# Patient Record
Sex: MALE | Race: WHITE | NOT HISPANIC OR LATINO | Employment: UNEMPLOYED | URBAN - METROPOLITAN AREA
[De-identification: names, ages, dates, MRNs, and addresses within clinical notes are randomized per-mention and may not be internally consistent; named-entity substitution may affect disease eponyms.]

---

## 2023-03-14 PROBLEM — H53.9 VISION CHANGES: Status: ACTIVE | Noted: 2023-03-14

## 2023-03-14 PROBLEM — G89.29 CHRONIC BACK PAIN: Status: ACTIVE | Noted: 2023-03-14

## 2023-03-14 PROBLEM — B37.0 ORAL THRUSH: Status: ACTIVE | Noted: 2023-03-14

## 2023-03-14 PROBLEM — F17.200 SMOKER: Status: ACTIVE | Noted: 2023-03-14

## 2023-03-14 PROBLEM — R91.1 LUNG NODULE: Status: ACTIVE | Noted: 2023-03-14

## 2023-03-14 PROBLEM — H91.93 DECREASED HEARING OF BOTH EARS: Status: ACTIVE | Noted: 2023-03-14

## 2023-03-14 PROBLEM — E66.811 CLASS 1 OBESITY WITH BODY MASS INDEX (BMI) OF 30.0 TO 30.9 IN ADULT: Status: ACTIVE | Noted: 2023-03-14

## 2023-03-14 PROBLEM — H57.89 IRRITATION OF RIGHT EYE: Status: ACTIVE | Noted: 2023-03-14

## 2023-03-14 PROBLEM — H52.03 HYPERMETROPIA OF BOTH EYES: Status: ACTIVE | Noted: 2023-03-14

## 2023-03-14 PROBLEM — M25.522 LEFT ELBOW PAIN: Status: ACTIVE | Noted: 2023-03-14

## 2023-03-14 PROBLEM — J02.9 SORE THROAT: Status: ACTIVE | Noted: 2023-03-14

## 2023-03-14 PROBLEM — H11.153 PINGUECULA OF BOTH EYES: Status: ACTIVE | Noted: 2023-03-14

## 2023-03-14 PROBLEM — H04.129 DRY EYE SYNDROME: Status: ACTIVE | Noted: 2023-03-14

## 2023-03-14 PROBLEM — M25.532 LEFT WRIST PAIN: Status: ACTIVE | Noted: 2023-03-14

## 2023-03-14 PROBLEM — E79.0 HYPERURICEMIA: Status: ACTIVE | Noted: 2023-03-14

## 2023-03-14 PROBLEM — H91.90 HEARING LOSS: Status: ACTIVE | Noted: 2023-03-14

## 2023-03-14 PROBLEM — E78.5 DYSLIPIDEMIA, GOAL LDL BELOW 130: Status: ACTIVE | Noted: 2023-03-14

## 2023-03-14 PROBLEM — R73.9 HYPERGLYCEMIA: Status: ACTIVE | Noted: 2023-03-14

## 2023-03-14 PROBLEM — R94.120 NEGATIVE MIDDLE EAR PRESSURE OF RIGHT EAR WITH TYPE C TYMPANOGRAM CURVE: Status: ACTIVE | Noted: 2023-03-14

## 2023-03-14 PROBLEM — E66.9 CLASS 1 OBESITY WITH BODY MASS INDEX (BMI) OF 30.0 TO 30.9 IN ADULT: Status: ACTIVE | Noted: 2023-03-14

## 2023-03-14 PROBLEM — R42 VERTIGO: Status: ACTIVE | Noted: 2023-03-14

## 2023-03-14 PROBLEM — R91.8 PULMONARY NODULES: Status: ACTIVE | Noted: 2023-03-14

## 2023-03-14 PROBLEM — R22.31 MASS OF RIGHT AXILLA: Status: ACTIVE | Noted: 2023-03-14

## 2023-03-14 PROBLEM — K21.9 ACID REFLUX: Status: ACTIVE | Noted: 2023-03-14

## 2023-03-14 PROBLEM — M54.9 CHRONIC BACK PAIN: Status: ACTIVE | Noted: 2023-03-14

## 2023-03-14 PROBLEM — H52.4 BILATERAL PRESBYOPIA: Status: ACTIVE | Noted: 2023-03-14

## 2023-03-14 PROBLEM — M79.89 SWELLING OF LEFT HAND: Status: ACTIVE | Noted: 2023-03-14

## 2023-03-14 PROBLEM — H57.10 EYE PAIN: Status: ACTIVE | Noted: 2023-03-14

## 2023-03-14 PROBLEM — I10 ESSENTIAL HYPERTENSION: Status: ACTIVE | Noted: 2023-03-14

## 2023-03-14 PROBLEM — H52.203 ASTIGMATISM, BILATERAL: Status: ACTIVE | Noted: 2023-03-14

## 2023-03-14 PROBLEM — H90.6 MIXED HEARING LOSS, BILATERAL: Status: ACTIVE | Noted: 2023-03-14

## 2023-03-14 PROBLEM — R09.89 BRUIT: Status: ACTIVE | Noted: 2023-03-14

## 2023-03-14 PROBLEM — K29.70 GASTRITIS: Status: ACTIVE | Noted: 2023-03-14

## 2023-03-14 PROBLEM — H57.9 EYE EXAM ABNORMAL: Status: ACTIVE | Noted: 2023-03-14

## 2023-03-14 PROBLEM — H10.9 CONJUNCTIVITIS: Status: ACTIVE | Noted: 2023-03-14

## 2023-03-14 PROBLEM — H31.099 PERIPHERAL RETINAL SCARS: Status: ACTIVE | Noted: 2023-03-14

## 2023-03-14 PROBLEM — J44.9 COPD (CHRONIC OBSTRUCTIVE PULMONARY DISEASE) (MULTI): Status: ACTIVE | Noted: 2023-03-14

## 2023-03-14 PROBLEM — R93.1 ABNORMAL SCREENING CARDIAC CT: Status: ACTIVE | Noted: 2023-03-14

## 2023-03-14 PROBLEM — K44.9 HIATAL HERNIA: Status: ACTIVE | Noted: 2023-03-14

## 2023-03-14 PROBLEM — E78.2 MIXED HYPERLIPIDEMIA: Status: ACTIVE | Noted: 2023-03-14

## 2023-03-14 PROBLEM — R93.89 ABNORMAL CHEST CT: Status: ACTIVE | Noted: 2023-03-14

## 2023-03-14 PROBLEM — H93.13 SUBJECTIVE TINNITUS OF BOTH EARS: Status: ACTIVE | Noted: 2023-03-14

## 2023-03-14 RX ORDER — COLCHICINE 0.6 MG/1
0.6 TABLET ORAL 2 TIMES DAILY PRN
COMMUNITY
Start: 2022-02-11

## 2023-03-14 RX ORDER — ALBUTEROL SULFATE 90 UG/1
AEROSOL, METERED RESPIRATORY (INHALATION)
COMMUNITY
Start: 2022-03-16

## 2023-03-14 RX ORDER — TIOTROPIUM BROMIDE AND OLODATEROL 3.124; 2.736 UG/1; UG/1
2 SPRAY, METERED RESPIRATORY (INHALATION) DAILY
COMMUNITY
Start: 2022-03-18

## 2023-03-14 RX ORDER — ROSUVASTATIN CALCIUM 5 MG/1
1 TABLET, COATED ORAL DAILY
COMMUNITY
Start: 2022-09-14 | End: 2023-09-11

## 2023-03-14 RX ORDER — ALLOPURINOL 300 MG/1
1 TABLET ORAL EVERY MORNING
COMMUNITY
Start: 2022-06-13 | End: 2023-09-26 | Stop reason: SDUPTHER

## 2023-03-14 RX ORDER — AMLODIPINE BESYLATE 5 MG/1
5 TABLET ORAL DAILY
COMMUNITY
End: 2023-09-26 | Stop reason: SDUPTHER

## 2023-03-16 ENCOUNTER — APPOINTMENT (OUTPATIENT)
Dept: PRIMARY CARE | Facility: CLINIC | Age: 63
End: 2023-03-16
Payer: MEDICAID

## 2023-05-22 DIAGNOSIS — E79.0 HYPERURICEMIA: ICD-10-CM

## 2023-05-22 RX ORDER — ALLOPURINOL 100 MG/1
TABLET ORAL
Qty: 90 TABLET | Refills: 3 | Status: SHIPPED | OUTPATIENT
Start: 2023-05-22 | End: 2023-09-26 | Stop reason: SDUPTHER

## 2023-09-10 DIAGNOSIS — E79.0 HYPERURICEMIA: ICD-10-CM

## 2023-09-10 DIAGNOSIS — E78.2 MIXED HYPERLIPIDEMIA: ICD-10-CM

## 2023-09-11 RX ORDER — ROSUVASTATIN CALCIUM 5 MG/1
5 TABLET, COATED ORAL DAILY
Qty: 90 TABLET | Refills: 3 | Status: SHIPPED | OUTPATIENT
Start: 2023-09-11 | End: 2023-09-26 | Stop reason: SDUPTHER

## 2023-09-20 ENCOUNTER — OFFICE VISIT (OUTPATIENT)
Dept: PRIMARY CARE | Facility: CLINIC | Age: 63
End: 2023-09-20
Payer: MEDICAID

## 2023-09-20 VITALS
WEIGHT: 203 LBS | TEMPERATURE: 98 F | HEART RATE: 99 BPM | OXYGEN SATURATION: 95 % | DIASTOLIC BLOOD PRESSURE: 80 MMHG | SYSTOLIC BLOOD PRESSURE: 140 MMHG | HEIGHT: 69 IN | RESPIRATION RATE: 16 BRPM | BODY MASS INDEX: 30.07 KG/M2

## 2023-09-20 DIAGNOSIS — J44.9 CHRONIC OBSTRUCTIVE PULMONARY DISEASE, UNSPECIFIED COPD TYPE (MULTI): ICD-10-CM

## 2023-09-20 DIAGNOSIS — R91.1 LUNG NODULE: ICD-10-CM

## 2023-09-20 DIAGNOSIS — E78.2 MIXED HYPERLIPIDEMIA: ICD-10-CM

## 2023-09-20 DIAGNOSIS — M10.9 GOUT, UNSPECIFIED CAUSE, UNSPECIFIED CHRONICITY, UNSPECIFIED SITE: ICD-10-CM

## 2023-09-20 DIAGNOSIS — Z12.5 SCREENING PSA (PROSTATE SPECIFIC ANTIGEN): ICD-10-CM

## 2023-09-20 DIAGNOSIS — Z00.00 WELL ADULT EXAM: ICD-10-CM

## 2023-09-20 DIAGNOSIS — I10 ESSENTIAL HYPERTENSION: Primary | ICD-10-CM

## 2023-09-20 PROBLEM — M79.89 SWELLING OF LEFT HAND: Status: RESOLVED | Noted: 2023-03-14 | Resolved: 2023-09-20

## 2023-09-20 PROBLEM — B37.0 ORAL THRUSH: Status: RESOLVED | Noted: 2023-03-14 | Resolved: 2023-09-20

## 2023-09-20 PROBLEM — J02.9 SORE THROAT: Status: RESOLVED | Noted: 2023-03-14 | Resolved: 2023-09-20

## 2023-09-20 PROBLEM — H10.9 CONJUNCTIVITIS: Status: RESOLVED | Noted: 2023-03-14 | Resolved: 2023-09-20

## 2023-09-20 PROBLEM — M25.532 LEFT WRIST PAIN: Status: RESOLVED | Noted: 2023-03-14 | Resolved: 2023-09-20

## 2023-09-20 PROBLEM — M25.522 LEFT ELBOW PAIN: Status: RESOLVED | Noted: 2023-03-14 | Resolved: 2023-09-20

## 2023-09-20 PROBLEM — R42 VERTIGO: Status: RESOLVED | Noted: 2023-03-14 | Resolved: 2023-09-20

## 2023-09-20 PROBLEM — K29.70 GASTRITIS: Status: RESOLVED | Noted: 2023-03-14 | Resolved: 2023-09-20

## 2023-09-20 PROBLEM — M1A.09X0 IDIOPATHIC CHRONIC GOUT OF MULTIPLE SITES WITHOUT TOPHUS: Status: ACTIVE | Noted: 2023-03-14

## 2023-09-20 PROBLEM — K21.9 ACID REFLUX: Status: RESOLVED | Noted: 2023-03-14 | Resolved: 2023-09-20

## 2023-09-20 LAB
ALANINE AMINOTRANSFERASE (SGPT) (U/L) IN SER/PLAS: 19 U/L (ref 10–52)
ALBUMIN (G/DL) IN SER/PLAS: 4.3 G/DL (ref 3.4–5)
ALKALINE PHOSPHATASE (U/L) IN SER/PLAS: 55 U/L (ref 33–136)
ANION GAP IN SER/PLAS: 12 MMOL/L (ref 10–20)
ASPARTATE AMINOTRANSFERASE (SGOT) (U/L) IN SER/PLAS: 21 U/L (ref 9–39)
BILIRUBIN TOTAL (MG/DL) IN SER/PLAS: 0.9 MG/DL (ref 0–1.2)
CALCIUM (MG/DL) IN SER/PLAS: 9 MG/DL (ref 8.6–10.3)
CARBON DIOXIDE, TOTAL (MMOL/L) IN SER/PLAS: 27 MMOL/L (ref 21–32)
CHLORIDE (MMOL/L) IN SER/PLAS: 104 MMOL/L (ref 98–107)
CHOLESTEROL (MG/DL) IN SER/PLAS: 134 MG/DL (ref 0–199)
CHOLESTEROL IN HDL (MG/DL) IN SER/PLAS: 50.9 MG/DL
CHOLESTEROL/HDL RATIO: 2.6
CREATININE (MG/DL) IN SER/PLAS: 0.73 MG/DL (ref 0.5–1.3)
ERYTHROCYTE DISTRIBUTION WIDTH (RATIO) BY AUTOMATED COUNT: 13.6 % (ref 11.5–14.5)
ERYTHROCYTE MEAN CORPUSCULAR HEMOGLOBIN CONCENTRATION (G/DL) BY AUTOMATED: 32.2 G/DL (ref 32–36)
ERYTHROCYTE MEAN CORPUSCULAR VOLUME (FL) BY AUTOMATED COUNT: 92 FL (ref 80–100)
ERYTHROCYTES (10*6/UL) IN BLOOD BY AUTOMATED COUNT: 5.27 X10E12/L (ref 4.5–5.9)
GFR MALE: >90 ML/MIN/1.73M2
GLUCOSE (MG/DL) IN SER/PLAS: 77 MG/DL (ref 74–99)
HEMATOCRIT (%) IN BLOOD BY AUTOMATED COUNT: 48.5 % (ref 41–52)
HEMOGLOBIN (G/DL) IN BLOOD: 15.6 G/DL (ref 13.5–17.5)
LDL: 62 MG/DL (ref 0–99)
LEUKOCYTES (10*3/UL) IN BLOOD BY AUTOMATED COUNT: 10.1 X10E9/L (ref 4.4–11.3)
PLATELETS (10*3/UL) IN BLOOD AUTOMATED COUNT: 356 X10E9/L (ref 150–450)
POTASSIUM (MMOL/L) IN SER/PLAS: 4 MMOL/L (ref 3.5–5.3)
PROTEIN TOTAL: 6.8 G/DL (ref 6.4–8.2)
SODIUM (MMOL/L) IN SER/PLAS: 139 MMOL/L (ref 136–145)
TRIGLYCERIDE (MG/DL) IN SER/PLAS: 108 MG/DL (ref 0–149)
URATE (MG/DL) IN SER/PLAS: 3.6 MG/DL (ref 4–7.5)
UREA NITROGEN (MG/DL) IN SER/PLAS: 9 MG/DL (ref 6–23)
VLDL: 22 MG/DL (ref 0–40)

## 2023-09-20 PROCEDURE — 85027 COMPLETE CBC AUTOMATED: CPT

## 2023-09-20 PROCEDURE — 80061 LIPID PANEL: CPT

## 2023-09-20 PROCEDURE — 84550 ASSAY OF BLOOD/URIC ACID: CPT

## 2023-09-20 PROCEDURE — 4004F PT TOBACCO SCREEN RCVD TLK: CPT | Performed by: FAMILY MEDICINE

## 2023-09-20 PROCEDURE — 80053 COMPREHEN METABOLIC PANEL: CPT

## 2023-09-20 PROCEDURE — 84153 ASSAY OF PSA TOTAL: CPT

## 2023-09-20 PROCEDURE — 99406 BEHAV CHNG SMOKING 3-10 MIN: CPT | Performed by: FAMILY MEDICINE

## 2023-09-20 PROCEDURE — 3079F DIAST BP 80-89 MM HG: CPT | Performed by: FAMILY MEDICINE

## 2023-09-20 PROCEDURE — 99396 PREV VISIT EST AGE 40-64: CPT | Performed by: FAMILY MEDICINE

## 2023-09-20 PROCEDURE — 3077F SYST BP >= 140 MM HG: CPT | Performed by: FAMILY MEDICINE

## 2023-09-20 ASSESSMENT — ENCOUNTER SYMPTOMS
FEVER: 0
OCCASIONAL FEELINGS OF UNSTEADINESS: 0
BRUISES/BLEEDS EASILY: 0
NECK STIFFNESS: 0
PALPITATIONS: 0
HEMATURIA: 0
FATIGUE: 0
BLOOD IN STOOL: 0
LOSS OF SENSATION IN FEET: 0
DYSURIA: 0
HEADACHES: 0
WEAKNESS: 0
HALLUCINATIONS: 0
BACK PAIN: 0
EYE PAIN: 0
POLYDIPSIA: 0
WOUND: 0
EYE REDNESS: 0
ADENOPATHY: 0
SHORTNESS OF BREATH: 0
SORE THROAT: 0
CHILLS: 0
RHINORRHEA: 0
ABDOMINAL PAIN: 1
COUGH: 0

## 2023-09-20 ASSESSMENT — PATIENT HEALTH QUESTIONNAIRE - PHQ9
SUM OF ALL RESPONSES TO PHQ9 QUESTIONS 1 AND 2: 0
1. LITTLE INTEREST OR PLEASURE IN DOING THINGS: NOT AT ALL
2. FEELING DOWN, DEPRESSED OR HOPELESS: NOT AT ALL

## 2023-09-20 NOTE — PROGRESS NOTES
Dale Serrato is a 63 y.o. male with Chief Complaint of Annual Exam.    Past Surgical History:   Procedure Laterality Date    APPENDECTOMY  07/06/2017    Appendectomy    HERNIA REPAIR  07/06/2017    Hernia Repair    KNEE SURGERY  07/06/2017    Knee Surgery Left      Social History     Socioeconomic History    Marital status:      Spouse name: Not on file    Number of children: Not on file    Years of education: Not on file    Highest education level: Not on file   Occupational History    Not on file   Tobacco Use    Smoking status: Every Day     Packs/day: 1.00     Years: 40.00     Additional pack years: 0.00     Total pack years: 40.00     Types: Cigarettes     Passive exposure: Current    Smokeless tobacco: Never   Vaping Use    Vaping Use: Never used   Substance and Sexual Activity    Alcohol use: Yes     Comment: social    Drug use: Never    Sexual activity: Yes     Partners: Female   Other Topics Concern    Not on file   Social History Narrative    Not on file     Social Determinants of Health     Financial Resource Strain: Not on file   Food Insecurity: Not on file   Transportation Needs: Not on file   Physical Activity: Not on file   Stress: Not on file   Social Connections: Not on file   Intimate Partner Violence: Not on file   Housing Stability: Not on file     Past Medical History:   Diagnosis Date    Epigastric pain 07/23/2021    Acute epigastric pain    Epigastric pain 09/28/2017    Abdominal pain, chronic, epigastric    Gout, unspecified 09/13/2021    Acute gout    Idiopathic gout, left ankle and foot 07/22/2020    Acute idiopathic gout involving toe of left foot    Localized swelling, mass and lump, right upper limb 01/11/2022    Mass of right axilla    Pain in left wrist 01/26/2021    Acute pain of left wrist    Personal history of colonic polyps 09/06/2017    History of colonic polyps    Personal history of diseases of the skin and subcutaneous tissue 07/06/2017    History of alopecia     "Personal history of other diseases of the respiratory system 03/28/2022    History of acute sinusitis    Personal history of other diseases of the respiratory system     History of chronic obstructive lung disease      Family History   Problem Relation Name Age of Onset    Arthritis Mother      Lung cancer Father      Brain cancer Father      Testicular cancer Brother      Thyroid cancer Daughter          Review of Systems   Constitutional:  Negative for chills, fatigue and fever.   HENT:  Negative for rhinorrhea and sore throat.    Eyes:  Negative for pain and redness.   Respiratory:  Negative for cough and shortness of breath.    Cardiovascular:  Negative for chest pain and palpitations.   Gastrointestinal:  Positive for abdominal pain. Negative for blood in stool.   Endocrine: Negative for polydipsia and polyuria.   Genitourinary:  Negative for dysuria and hematuria.   Musculoskeletal:  Negative for back pain and neck stiffness.   Skin:  Negative for rash and wound.   Allergic/Immunologic: Negative for environmental allergies and food allergies.   Neurological:  Negative for weakness and headaches.   Hematological:  Negative for adenopathy. Does not bruise/bleed easily.   Psychiatric/Behavioral:  Negative for hallucinations and suicidal ideas.       /80 (BP Location: Right arm, Patient Position: Sitting, BP Cuff Size: Adult)   Pulse 99   Temp 36.7 °C (98 °F) (Temporal)   Resp 16   Ht 1.753 m (5' 9\")   Wt 92.1 kg (203 lb)   SpO2 95%   BMI 29.98 kg/m²   Physical Exam  Vitals reviewed.   Constitutional:       General: He is not in acute distress.     Appearance: He is not ill-appearing.   HENT:      Head: Normocephalic and atraumatic.      Right Ear: Tympanic membrane normal.      Left Ear: Tympanic membrane normal.      Nose: No congestion or rhinorrhea.      Mouth/Throat:      Pharynx: No oropharyngeal exudate or posterior oropharyngeal erythema.   Eyes:      Extraocular Movements: Extraocular " "movements intact.      Conjunctiva/sclera: Conjunctivae normal.      Pupils: Pupils are equal, round, and reactive to light.   Cardiovascular:      Rate and Rhythm: Normal rate and regular rhythm.      Heart sounds: No murmur heard.     No friction rub. No gallop.   Pulmonary:      Effort: Pulmonary effort is normal.      Breath sounds: Normal breath sounds. No wheezing, rhonchi or rales.   Abdominal:      General: There is no distension.      Palpations: Abdomen is soft.      Tenderness: There is no abdominal tenderness. There is no guarding or rebound.   Musculoskeletal:         General: No swelling or deformity.      Cervical back: Normal range of motion and neck supple.      Right lower leg: No edema.      Left lower leg: No edema.   Skin:     Capillary Refill: Capillary refill takes less than 2 seconds.      Coloration: Skin is not jaundiced.      Findings: No rash.   Neurological:      General: No focal deficit present.      Mental Status: He is alert.      Motor: No weakness.   Psychiatric:         Mood and Affect: Mood normal.         Behavior: Behavior normal.       Lab Results   Component Value Date    WBC 10.6 09/14/2022    HGB 17.1 09/14/2022    HCT 52.0 09/14/2022    MCV 93 09/14/2022     09/14/2022     Lab Results   Component Value Date    CHOL 179 09/14/2022    CHOL 178 06/07/2021    CHOL 138 06/16/2020     Lab Results   Component Value Date    HDL 40.3 09/14/2022    HDL 40.6 06/07/2021    HDL 44.8 06/16/2020     No results found for: \"LDLCALC\"  Lab Results   Component Value Date    TRIG 151 (H) 09/14/2022    TRIG 144 06/16/2020    TRIG 190 (H) 01/27/2020     No components found for: \"CHOLHDL\"  Lab Results   Component Value Date    HGBA1C 4.9 01/27/2020       Assessment/Plan   Problem List Items Addressed This Visit       COPD (chronic obstructive pulmonary disease) (CMS/[facility])    Overview     Stable on Stiolto & Albuterol.  f/u with Dr Magaña.             Essential hypertension - Primary    " Overview     Stable at home.          Current Assessment & Plan     Continue current amldoipine 5 mg .          Lung nodule    Overview     continue annual low dose lung ca ct screening next due after 12/12/2023.         Mixed hyperlipidemia    Current Assessment & Plan     Monitor lipids.  Continue crestor 5 mg daily.          Well adult exam    Overview     9/20/23 Recommend smoking cessation -- counseled x 5 minutes -- precontemplative.     9/20/23 Preventative exam: encouraged continue exercise and healthy diet -- down 30# in past year and breathing better.     # Right gluteal cleft skin tag -- benign acrochordon. Can remove if becomes inflamed or irritated or bleeds.     # Hx of Alopecia areata -- growing back now, taking vitamin D. Had CSI and rogain which didn't seem to work.     # Hx Right axillary mass -- saw Dr Schultz for consultation -- not ready for surgery. planning on MRI.       # Left Testicle -- Epidipidymal cyst < 1 cm. non tender. not infected. Reassurance provided.     Colonoscopy 2017 (hyperplastic polyp only) Recheck due 2027.     Recommend annual flu vaccine, staying up to date on COVID vaccines. Declines today. Tdap due 2030. Offer shingrix through pharmacy.     BMI under 30!!! Well done!

## 2023-09-21 LAB — PROSTATE SPECIFIC AG (NG/ML) IN SER/PLAS: 0.44 NG/ML (ref 0–4)

## 2023-09-26 DIAGNOSIS — I10 ESSENTIAL HYPERTENSION: ICD-10-CM

## 2023-09-26 DIAGNOSIS — E78.2 MIXED HYPERLIPIDEMIA: ICD-10-CM

## 2023-09-26 DIAGNOSIS — E79.0 HYPERURICEMIA: ICD-10-CM

## 2023-09-26 RX ORDER — AMLODIPINE BESYLATE 5 MG/1
5 TABLET ORAL DAILY
Qty: 90 TABLET | Refills: 3 | Status: SHIPPED | OUTPATIENT
Start: 2023-09-26

## 2023-09-26 RX ORDER — ALLOPURINOL 100 MG/1
100 TABLET ORAL DAILY
Qty: 90 TABLET | Refills: 3 | Status: SHIPPED | OUTPATIENT
Start: 2023-09-26

## 2023-09-26 RX ORDER — ALLOPURINOL 300 MG/1
300 TABLET ORAL EVERY MORNING
Qty: 90 TABLET | Refills: 3 | Status: SHIPPED | OUTPATIENT
Start: 2023-09-26

## 2023-09-26 RX ORDER — ROSUVASTATIN CALCIUM 5 MG/1
5 TABLET, COATED ORAL DAILY
Qty: 90 TABLET | Refills: 3 | Status: SHIPPED | OUTPATIENT
Start: 2023-09-26

## 2024-10-11 ENCOUNTER — APPOINTMENT (OUTPATIENT)
Dept: PRIMARY CARE | Facility: CLINIC | Age: 64
End: 2024-10-11
Payer: MEDICAID

## 2024-10-15 DIAGNOSIS — E78.2 MIXED HYPERLIPIDEMIA: ICD-10-CM

## 2024-10-15 RX ORDER — ROSUVASTATIN CALCIUM 5 MG/1
5 TABLET, COATED ORAL DAILY
Qty: 90 TABLET | Refills: 3 | Status: SHIPPED | OUTPATIENT
Start: 2024-10-15

## 2024-12-23 DIAGNOSIS — E79.0 HYPERURICEMIA: ICD-10-CM

## 2024-12-23 RX ORDER — ALLOPURINOL 100 MG/1
100 TABLET ORAL DAILY
Qty: 90 TABLET | Refills: 0 | Status: SHIPPED | OUTPATIENT
Start: 2024-12-23

## 2024-12-23 RX ORDER — ALLOPURINOL 300 MG/1
300 TABLET ORAL
Qty: 90 TABLET | Refills: 0 | Status: SHIPPED | OUTPATIENT
Start: 2024-12-23

## 2025-03-23 DIAGNOSIS — E79.0 HYPERURICEMIA: ICD-10-CM

## 2025-03-24 RX ORDER — ALLOPURINOL 300 MG/1
300 TABLET ORAL
Qty: 90 TABLET | Refills: 3 | Status: SHIPPED | OUTPATIENT
Start: 2025-03-24

## 2025-03-24 RX ORDER — ALLOPURINOL 100 MG/1
100 TABLET ORAL DAILY
Qty: 90 TABLET | Refills: 3 | Status: SHIPPED | OUTPATIENT
Start: 2025-03-24

## 2025-10-24 ENCOUNTER — APPOINTMENT (OUTPATIENT)
Dept: PRIMARY CARE | Facility: CLINIC | Age: 65
End: 2025-10-24
Payer: MEDICAID